# Patient Record
Sex: FEMALE | Race: WHITE | NOT HISPANIC OR LATINO | Employment: FULL TIME | URBAN - METROPOLITAN AREA
[De-identification: names, ages, dates, MRNs, and addresses within clinical notes are randomized per-mention and may not be internally consistent; named-entity substitution may affect disease eponyms.]

---

## 2022-08-29 ENCOUNTER — HOSPITAL ENCOUNTER (EMERGENCY)
Facility: CLINIC | Age: 32
Discharge: HOME | End: 2022-08-29
Attending: NURSE PRACTITIONER | Admitting: NURSE PRACTITIONER
Payer: COMMERCIAL

## 2022-08-29 VITALS
DIASTOLIC BLOOD PRESSURE: 60 MMHG | RESPIRATION RATE: 18 BRPM | TEMPERATURE: 98 F | OXYGEN SATURATION: 92 % | HEART RATE: 77 BPM | SYSTOLIC BLOOD PRESSURE: 97 MMHG

## 2022-08-29 DIAGNOSIS — F32.A DEPRESSION, UNSPECIFIED DEPRESSION TYPE: ICD-10-CM

## 2022-08-29 DIAGNOSIS — F41.9 ANXIETY: ICD-10-CM

## 2022-08-29 DIAGNOSIS — U07.1 INFECTION DUE TO 2019 NOVEL CORONAVIRUS: ICD-10-CM

## 2022-08-29 LAB
HCG UR QL: NEGATIVE
SARS-COV-2 RNA RESP QL NAA+PROBE: POSITIVE

## 2022-08-29 PROCEDURE — 81025 URINE PREGNANCY TEST: CPT

## 2022-08-29 PROCEDURE — U0005 INFEC AGEN DETEC AMPLI PROBE: HCPCS | Performed by: NURSE PRACTITIONER

## 2022-08-29 PROCEDURE — 90791 PSYCH DIAGNOSTIC EVALUATION: CPT

## 2022-08-29 PROCEDURE — 99285 EMERGENCY DEPT VISIT HI MDM: CPT | Mod: 25

## 2022-08-29 PROCEDURE — C9803 HOPD COVID-19 SPEC COLLECT: HCPCS

## 2022-08-29 ASSESSMENT — ENCOUNTER SYMPTOMS
SLEEP DISTURBANCE: 1
HALLUCINATIONS: 0
COUGH: 0
FEVER: 0
NERVOUS/ANXIOUS: 1
DYSPHORIC MOOD: 1

## 2022-08-29 ASSESSMENT — ACTIVITIES OF DAILY LIVING (ADL): ADLS_ACUITY_SCORE: 35

## 2022-08-29 NOTE — ED NOTES
Emergency Department Attending Supervision Note  8/29/2022  4:22 PM      I evaluated this patient in conjunction with Malika David PA-C      Briefly, the patient presented with her father with concern for increased anxiety. The patient has had problems related to anxiety and depression over the past two decades but has not sought treatment with exception that she has seen a therapist for a short period with little to no improvement. She has recent difficulty sleeping at night as well.  She has thoughts that it may be easier if she was not around but she denies plan for suicide.  She drinks alcohol occasionally but denies drugs or tobacco. She denies homicidal ideation or hallucinations.    On my exam,   Nursing notes reviewed. Vitals reviewed.  General: Alert. Well kept.  Eyes:  Conjunctiva non-injected, non-icteric.  Neck/Throat: Moist mucous membranes. Normal voice.  Cardiac: Regular rhythm. Normal heart sounds.  Pulmonary: Clear and equal breath sounds bilaterally.   Musculoskeletal: Normal gross range of motion of all 4 extremities.   Neurological: Alert and oriented x4.   Skin: Warm and dry. Normal appearance of visualized exposed skin without rashes or petechiae.  Psych: Affect normal. Good eye contact.  --Physical appearance, attire: Appears stated age. Hygiene well groomed. Eye contact at examiner.   --Speech regular, speech volume regular, speech quality fluid.   --Cognitive, perceptual reality based. Cognition, memory intact, judgment intact, insight intact.   --Orientation to time, place, person and situation. Thought logical.   --Hallucinations: None.   --General behavioral tone: Cooperative.  --Psychomotor activity: No problem noted. Gait: No problem.   --Mood normal. Affect congruent and appropriate.    Results:  Laboratory:  Labs Ordered and Resulted from Time of ED Arrival to Time of ED Departure   COVID-19 VIRUS (CORONAVIRUS) BY PCR - Abnormal       Result Value    SARS CoV2 PCR Positive (*)     HCG QUALITATIVE URINE - Normal    hCG Urine Qualitative Negative       ED course:  1644 I obtained history and performed exam  1807 DEC assessment completed.    My impression is Denise Silva is an otherwise healthy 32-year-old female who presents for concern of anxiety and depression.  The patient denies overt suicidal ideation but does note she feels things may be easier if she is no longer around.  She has taken no medications to harm her self.  She denies homicidal thoughts or ideations.  Unfortunately COVID testing did return positive.  Patient was evaluated using tele DEC and given resources for outpatient follow-up.  No indication for MICHELLE or 72-hour hold.  Patient did feel comfortable discharging home know she may return at any point with worsening symptoms.  The patient has no hypoxia or signs of COVID-pneumonia.  Patient is in agreement this plan and is discharged home with close outpatient mental health consultation.    Diagnosis    ICD-10-CM    1. Depression, unspecified depression type  F32.A    2. Anxiety  F41.9    3. Infection due to 2019 novel coronavirus  U07.1     asymptomatic, incidental finding     Sonia Gibbons, Sonia Cutler CNP  08/29/22 4808
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
## 2022-08-29 NOTE — DISCHARGE INSTRUCTIONS
Aftercare Plan  If I am feeling unsafe or I am in a crisis, I will: reach out to my family, friends and [facility] crisis line for their support.  Contact my established care providers   Call the National Suicide Prevention Lifeline: 988  Go to the nearest emergency room   Call 911     Anuradhar encourage Pt to take her future medications as prescribed and keep all of scheduled appointments with her outpatient service providers.  Writer recommended Pt to engage in new outpatient psychiatry for medication management and individual therapy service to improve coping skills.  Writer also recommend family therapy to address family relationship issues.  DEC Coordinator will contact Pt within next 1 or 2 business days to ensure coordination of care and provide assistance with appointments.      Anuradhar was not able to schedule Pt's new outpatient psychiatry appointment at this time.  However, Pt has her new outpatient virtual therapy appointment scheduled with the following service provider:    Karley Bullard  [facility] Counseling, Coaching, and Consultation  [address], [address] [phone] 9/1/2022 1:00 pm    Warning signs that I or other people might notice when a crisis is developing for me: increased depression, isolation, cry, worry, rumination, anxiety, suicidal ideations, family conflict, loneliness, disrupted sleep and appetite.      Things I am able to do on my own to cope or help me feel better: deep breathing exercise, meditation, positive affirmations, yoga, taking walks, spending time with my cat, playing tennis, hiking, rock climbing, eating healthy and getting adequate rest, watching TV, movies and listening to music as distraction.    Things that I am able to do with others to cope or help me better: socializing with my friends and family.     Things I can use or do for distraction: deep breathing exercise, meditation, positive affirmations, yoga, taking walks, spending time with my cat, playing  tennis, hiking, rock climbing, eating healthy and getting adequate rest, watching TV, movies and listening to music as distraction.    Changes I can make to support my mental health and wellness: Continue to practice good self-care skills, sleep routine, getting adequate sleep/rest, heathy diet and regular exercise.  Joining a peer support group through [facility] [place] to increase positive support system.    [facility] ([employer] [place] on Mental Illness) improves the lives of children and adults with mental illnesses and their families by providing free classes on mental illnesses and support groups for adults with mental illnesses, parents and family members. For more information:  Phone: 288.270.6426  Toll free: 8-059-BTBH-[employer]  Website: www.Videolicious.orghttp://www.Videolicious.org/     People in my life that I can ask for help: my family and friends.    Your [facility] has a mental health crisis team you can call 24/7: [facility] Crisis  [phone]    Other things that are important when I'm in crisis: support from my family and friends.  National Suicide Prevention Lifeline at 988  Throughout  [place]: call **CRISIS (**161645)  Crisis Text Line: is available for free, 24/7 by texting [place] to 307338     Additional resources and information:   Pt has her new outpatient virtual therapy appointment scheduled with the following service provider:    Karley Bullard  Intentional Self Counseling, Coaching, and Consultation  [address], [address] [phone] 9/1/2022 1:00 pm    Below is a list of FREE Mental Health Options in the [facility] Area:    [facility] ([facility])  Serves those in emotional crisis with 24-hour, seven-day-a-week crisis counseling, assessment, referral, and medication management.   Suicidal: [phone] Consultation: [phone]  [address] [place] [address], 24/7 Crisis Intervention Center     [facility]  [phone]  Serves those in need of free outpatient mental health  "care  Hours: Mon, Wed, Fri 1-3pm; Mon-Thurs 6:30-8:30pm    [facility]  [address] [place] [address]  [phone]       Crisis Lines  Crisis Text Line  Text 075843  You will be connected with a trained live crisis counselor to provide support.    Por espanol, texto  JACINTO a 819307 o texto a 442-AYUDAME en WhatsApp    [employer] (LGBTQ Youth Crisis Line)  6.327.446.1525  text START to 091-344      CoachBase  Fast Tracker  Linking people to mental health and substance use disorder resources  Parcel.SiOx     [place] Mental Health Warm Line  Peer to peer support  Monday thru Saturday, 12 pm to 10 pm  203.552.6088 or 5.855.104.7799  Text \"Support\" to 14896    [employer] [place] on Mental Illness (JAM)  182.936.5128 or 1.888.JAM.HELPS      Mental Health Apps  My3  https://nDreams.org/    VirtualHopeBox  https://UUSEE/apps/virtual-hope-box/      Additional Information  Today you were seen by a licensed mental health professional through Triage and Transition services, Behavioral Healthcare Providers ([facility])  for a crisis assessment in the Emergency Department at [facility].  It is recommended that you follow up with your established providers (psychiatrist, mental health therapist, and/or primary care doctor - as relevant) as soon as possible. Coordinators from [facility] will be calling you in the next 24-48 hours to ensure that you have the resources you need.  You can also contact [facility] coordinators directly at [phone]. You may have been scheduled for or offered an appointment with a mental health provider. [facility] maintains an extensive network of licensed behavioral health providers to connect patients with the services they need.  We do not charge providers a fee to participate in our referral network.  We match patients with providers based on a patient's specific needs, insurance coverage, and location.  Our first effort will be " to refer you to a provider within your care system, and will utilize providers outside your care system as needed.

## 2024-11-11 NOTE — ED TRIAGE NOTES
"    Patient presents to the ED complaining of having a nervous breakdown.  States things are not working out.  When asked if she was suicidal she stated \"I dont think so, but what happens if I say yes\"   " no